# Patient Record
Sex: MALE | Race: WHITE | NOT HISPANIC OR LATINO
[De-identification: names, ages, dates, MRNs, and addresses within clinical notes are randomized per-mention and may not be internally consistent; named-entity substitution may affect disease eponyms.]

---

## 2020-07-27 ENCOUNTER — TRANSCRIPTION ENCOUNTER (OUTPATIENT)
Age: 25
End: 2020-07-27

## 2020-08-15 ENCOUNTER — TRANSCRIPTION ENCOUNTER (OUTPATIENT)
Age: 25
End: 2020-08-15

## 2021-07-28 ENCOUNTER — TRANSCRIPTION ENCOUNTER (OUTPATIENT)
Age: 26
End: 2021-07-28

## 2022-01-30 ENCOUNTER — TRANSCRIPTION ENCOUNTER (OUTPATIENT)
Age: 27
End: 2022-01-30

## 2022-02-15 ENCOUNTER — TRANSCRIPTION ENCOUNTER (OUTPATIENT)
Age: 27
End: 2022-02-15

## 2022-02-21 ENCOUNTER — TRANSCRIPTION ENCOUNTER (OUTPATIENT)
Age: 27
End: 2022-02-21

## 2022-03-01 ENCOUNTER — TRANSCRIPTION ENCOUNTER (OUTPATIENT)
Age: 27
End: 2022-03-01

## 2022-03-17 ENCOUNTER — TRANSCRIPTION ENCOUNTER (OUTPATIENT)
Age: 27
End: 2022-03-17

## 2022-07-19 ENCOUNTER — NON-APPOINTMENT (OUTPATIENT)
Age: 27
End: 2022-07-19

## 2022-08-03 ENCOUNTER — NON-APPOINTMENT (OUTPATIENT)
Age: 27
End: 2022-08-03

## 2022-11-06 ENCOUNTER — NON-APPOINTMENT (OUTPATIENT)
Age: 27
End: 2022-11-06

## 2023-03-09 ENCOUNTER — NON-APPOINTMENT (OUTPATIENT)
Age: 28
End: 2023-03-09

## 2024-01-22 ENCOUNTER — NON-APPOINTMENT (OUTPATIENT)
Age: 29
End: 2024-01-22

## 2024-01-22 ENCOUNTER — APPOINTMENT (OUTPATIENT)
Dept: OTOLARYNGOLOGY | Facility: CLINIC | Age: 29
End: 2024-01-22
Payer: COMMERCIAL

## 2024-01-22 VITALS
HEART RATE: 60 BPM | TEMPERATURE: 98 F | WEIGHT: 195 LBS | BODY MASS INDEX: 24.25 KG/M2 | HEIGHT: 75 IN | OXYGEN SATURATION: 99 % | SYSTOLIC BLOOD PRESSURE: 137 MMHG | DIASTOLIC BLOOD PRESSURE: 79 MMHG

## 2024-01-22 DIAGNOSIS — R06.83 SNORING: ICD-10-CM

## 2024-01-22 DIAGNOSIS — J34.2 DEVIATED NASAL SEPTUM: ICD-10-CM

## 2024-01-22 DIAGNOSIS — R09.81 NASAL CONGESTION: ICD-10-CM

## 2024-01-22 PROBLEM — Z00.00 ENCOUNTER FOR PREVENTIVE HEALTH EXAMINATION: Status: ACTIVE | Noted: 2024-01-22

## 2024-01-22 PROCEDURE — 31231 NASAL ENDOSCOPY DX: CPT

## 2024-01-22 PROCEDURE — 99205 OFFICE O/P NEW HI 60 MIN: CPT | Mod: 25

## 2024-01-22 RX ORDER — FLUTICASONE PROPIONATE 50 UG/1
50 SPRAY, METERED NASAL DAILY
Qty: 3 | Refills: 1 | Status: ACTIVE | COMMUNITY
Start: 2024-01-22 | End: 1900-01-01

## 2024-01-22 NOTE — PROCEDURE
[FreeTextEntry6] : -  Pre-operative Diagnosis: Nasal congestion Post-operative Diagnosis: Left septal deviation  Anesthesia: Topical Procedure: Bilateral nasal endoscopy Procedure Details:   After topical anesthesia and decongestant, the patient was placed in the supine position. The telescope was passed along the left nasal floor to the nasopharynx. It was then passed into the region of the middle meatus, middle turbinate, and the sphenoethmoid region. An identical procedure was performed on the right side.   Findings: Mucosa:   normal Nasal septum: left septal deviation  Discharge:  none Turbinates:  normal Adenoid:   normal Posterior choanae:  normal Eustachian tubes:  normal  Mucous stranding:  normal   Lesions:   Not present   Comments:   Condition: Stable. Patient tolerated procedure well.

## 2024-01-22 NOTE — ASSESSMENT
[FreeTextEntry1] : - 29 y/o M with history of allergies presents with nasal congestion/ nasal obstruction left worse than right. On physical exam/ nasal endoscopy he was found to have left septal deviation and positive Karnes maneuver bilaterally. He was reassured there was no evidence of sinusitis and sinus surgery is not indicated at this time. I am recommending nasal saline rinses and nasal steroids and Breathe Rite strips. Follow up in 1 month. If symptoms persist consider possible  septoplasty/ turbinectomy-nasal valve repair in the future.   -nasal saline rinses and nasal steroids -Breathe Rite strips -Follow up in 1 month  -If symptoms persist may benefit from septoplasty, turbinectomy, nasal valve repair

## 2024-01-22 NOTE — PHYSICAL EXAM
[] : septum deviated to the left [Normal] : lingual tonsils are normal [Midline] : trachea located in midline position [de-identified] : + Butts maneuver bilaterally

## 2024-01-22 NOTE — HISTORY OF PRESENT ILLNESS
[de-identified] : 1/22/24: 29 y/o M with history of allergies presents with nasal congestion/ nasal obstruction left worse than right. He also snores and breathes through his mouth when he sleeps. His wife says that he snores. No witnessed episodes of apnea, or daytime somnolence. He denies facial pain or changes in smell/ taste. He recently saw an ENT,  2-3 weeks ago who recommended a balloon rhinoplasty/ fess but said he needed a septoplasty first.  Has not tried much in the way of treatment.  Today presents for second opinion.  No ENT issues otherwise.

## 2024-03-25 NOTE — ASSESSMENT
[FreeTextEntry1] : - 27 y/o M with history of allergies presents with nasal congestion/ nasal obstruction left worse than right. On physical exam/ nasal endoscopy he was found to have left septal deviation and positive Centre maneuver bilaterally. He was reassured there was no evidence of sinusitis and sinus surgery is not indicated at this time. I am recommending nasal saline rinses and nasal steroids and Breathe Rite strips. Follow up in 1 month. If symptoms persist consider possible  septoplasty/ turbinectomy-nasal valve repair in the future.   -nasal saline rinses and nasal steroids -Breathe Rite strips -Follow up in 1 month  -If symptoms persist may benefit from septoplasty, turbinectomy, nasal valve repair

## 2024-03-25 NOTE — PHYSICAL EXAM
[] : septum deviated to the left [Normal] : lingual tonsils are normal [Midline] : trachea located in midline position [de-identified] : + In[place] maneuver bilaterally

## 2024-03-25 NOTE — HISTORY OF PRESENT ILLNESS
[de-identified] : 1/22/24: 27 y/o M with history of allergies presents with nasal congestion/ nasal obstruction left worse than right. He also snores and breathes through his mouth when he sleeps. His wife says that he snores. No witnessed episodes of apnea, or daytime somnolence. He denies facial pain or changes in smell/ taste. He recently saw an ENT,  2-3 weeks ago who recommended a balloon rhinoplasty/ fess but said he needed a septoplasty first.  Has not tried much in the way of treatment.  Today presents for second opinion.  No ENT issues otherwise. - [FreeTextEntry1] : 3/22/24: Patient has been using nasal saline, nasal steroids and Breathe- Rite strips and notes

## 2024-09-06 ENCOUNTER — APPOINTMENT (OUTPATIENT)
Dept: OTOLARYNGOLOGY | Facility: CLINIC | Age: 29
End: 2024-09-06

## 2025-07-31 ENCOUNTER — APPOINTMENT (OUTPATIENT)
Dept: UROLOGY | Facility: CLINIC | Age: 30
End: 2025-07-31
Payer: COMMERCIAL

## 2025-07-31 ENCOUNTER — NON-APPOINTMENT (OUTPATIENT)
Age: 30
End: 2025-07-31

## 2025-07-31 VITALS
HEIGHT: 75 IN | WEIGHT: 195 LBS | OXYGEN SATURATION: 99 % | DIASTOLIC BLOOD PRESSURE: 87 MMHG | BODY MASS INDEX: 24.25 KG/M2 | SYSTOLIC BLOOD PRESSURE: 128 MMHG | RESPIRATION RATE: 18 BRPM | TEMPERATURE: 98.7 F | HEART RATE: 94 BPM

## 2025-07-31 DIAGNOSIS — N50.819 TESTICULAR PAIN, UNSPECIFIED: ICD-10-CM

## 2025-07-31 PROCEDURE — 99204 OFFICE O/P NEW MOD 45 MIN: CPT

## 2025-08-01 LAB
APPEARANCE: CLEAR
BACTERIA: NEGATIVE /HPF
BILIRUBIN URINE: NEGATIVE
BLOOD URINE: NEGATIVE
CAST: 0 /LPF
COLOR: YELLOW
EPITHELIAL CELLS: 0 /HPF
GLUCOSE QUALITATIVE U: NEGATIVE MG/DL
KETONES URINE: NEGATIVE MG/DL
LEUKOCYTE ESTERASE URINE: NEGATIVE
MICROSCOPIC-UA: NORMAL
NITRITE URINE: NEGATIVE
PH URINE: 6.5
PROTEIN URINE: NEGATIVE MG/DL
RED BLOOD CELLS URINE: 0 /HPF
SPECIFIC GRAVITY URINE: 1.01
UROBILINOGEN URINE: 0.2 MG/DL
WHITE BLOOD CELLS URINE: 0 /HPF

## 2025-08-02 LAB
BACTERIA UR CULT: NORMAL
C TRACH RRNA SPEC QL NAA+PROBE: NOT DETECTED
N GONORRHOEA RRNA SPEC QL NAA+PROBE: NOT DETECTED
SOURCE AMPLIFICATION: NORMAL

## 2025-08-20 ENCOUNTER — APPOINTMENT (OUTPATIENT)
Dept: UROLOGY | Facility: CLINIC | Age: 30
End: 2025-08-20
Payer: COMMERCIAL

## 2025-08-20 VITALS
BODY MASS INDEX: 24.25 KG/M2 | HEART RATE: 81 BPM | WEIGHT: 195 LBS | DIASTOLIC BLOOD PRESSURE: 82 MMHG | TEMPERATURE: 97.3 F | HEIGHT: 75 IN | SYSTOLIC BLOOD PRESSURE: 122 MMHG | OXYGEN SATURATION: 99 %

## 2025-08-20 PROCEDURE — 76870 US EXAM SCROTUM: CPT

## 2025-08-20 PROCEDURE — 99213 OFFICE O/P EST LOW 20 MIN: CPT

## 2025-08-20 RX ORDER — TRAZODONE HYDROCHLORIDE 300 MG/1
TABLET ORAL
Refills: 0 | Status: ACTIVE | COMMUNITY

## 2025-08-20 RX ORDER — DEXTROAMPHETAMINE SULFATE, DEXTROAMPHETAMINE SACCHARATE, AMPHETAMINE SULFATE AND AMPHETAMINE ASPARTATE 5; 5; 5; 5 MG/1; MG/1; MG/1; MG/1
20 CAPSULE, EXTENDED RELEASE ORAL
Refills: 0 | Status: ACTIVE | COMMUNITY